# Patient Record
Sex: MALE | Race: WHITE | NOT HISPANIC OR LATINO | Employment: FULL TIME | URBAN - METROPOLITAN AREA
[De-identification: names, ages, dates, MRNs, and addresses within clinical notes are randomized per-mention and may not be internally consistent; named-entity substitution may affect disease eponyms.]

---

## 2023-03-15 DIAGNOSIS — I10 HYPERTENSION, UNSPECIFIED TYPE: Primary | ICD-10-CM

## 2023-03-15 RX ORDER — AMLODIPINE BESYLATE 5 MG/1
5 TABLET ORAL DAILY
Qty: 90 TABLET | Refills: 0 | Status: SHIPPED | OUTPATIENT
Start: 2023-03-15 | End: 2023-06-16

## 2023-03-15 RX ORDER — AMLODIPINE BESYLATE 5 MG/1
5 TABLET ORAL DAILY
COMMUNITY
End: 2023-03-15 | Stop reason: SDUPTHER

## 2023-09-14 DIAGNOSIS — I10 PRIMARY HYPERTENSION: Primary | ICD-10-CM

## 2023-09-14 RX ORDER — LOSARTAN POTASSIUM AND HYDROCHLOROTHIAZIDE 25; 100 MG/1; MG/1
1 TABLET ORAL
Qty: 7 TABLET | Refills: 0 | Status: SHIPPED | OUTPATIENT
Start: 2023-09-14 | End: 2023-09-20 | Stop reason: SDUPTHER

## 2023-09-14 RX ORDER — LOSARTAN POTASSIUM AND HYDROCHLOROTHIAZIDE 25; 100 MG/1; MG/1
1 TABLET ORAL
COMMUNITY
Start: 2021-10-06 | End: 2023-09-14 | Stop reason: SDUPTHER

## 2023-09-19 RX ORDER — GABAPENTIN 300 MG/1
300 CAPSULE ORAL EVERY 8 HOURS
COMMUNITY

## 2023-09-19 RX ORDER — ATENOLOL 25 MG/1
25 TABLET ORAL DAILY
COMMUNITY
End: 2023-09-20 | Stop reason: ALTCHOICE

## 2023-09-19 RX ORDER — FLUTICASONE PROPIONATE 50 MCG
SPRAY, SUSPENSION (ML) NASAL
COMMUNITY
Start: 2023-06-20 | End: 2023-09-20 | Stop reason: ALTCHOICE

## 2023-09-19 RX ORDER — ATORVASTATIN CALCIUM 40 MG/1
40 TABLET, FILM COATED ORAL DAILY
COMMUNITY
End: 2023-09-20

## 2023-09-19 RX ORDER — OMEPRAZOLE 20 MG/1
20 CAPSULE, DELAYED RELEASE ORAL
COMMUNITY
Start: 2023-06-20 | End: 2023-09-20 | Stop reason: ALTCHOICE

## 2023-09-20 ENCOUNTER — LAB (OUTPATIENT)
Dept: LAB | Facility: LAB | Age: 58
End: 2023-09-20
Payer: COMMERCIAL

## 2023-09-20 ENCOUNTER — OFFICE VISIT (OUTPATIENT)
Dept: PRIMARY CARE | Facility: CLINIC | Age: 58
End: 2023-09-20
Payer: COMMERCIAL

## 2023-09-20 VITALS — BODY MASS INDEX: 38.45 KG/M2 | SYSTOLIC BLOOD PRESSURE: 150 MMHG | WEIGHT: 268 LBS | DIASTOLIC BLOOD PRESSURE: 102 MMHG

## 2023-09-20 DIAGNOSIS — T39.1X5A: ICD-10-CM

## 2023-09-20 DIAGNOSIS — F41.9 ANXIETY: Primary | ICD-10-CM

## 2023-09-20 DIAGNOSIS — I10 PRIMARY HYPERTENSION: ICD-10-CM

## 2023-09-20 DIAGNOSIS — I10 HYPERTENSION, UNSPECIFIED TYPE: ICD-10-CM

## 2023-09-20 DIAGNOSIS — F41.0 PANIC ATTACK: ICD-10-CM

## 2023-09-20 LAB
ACETAMINOPHEN (UG/ML) IN SER/PLAS: <10 UG/ML (ref 10–30)
ALANINE AMINOTRANSFERASE (SGPT) (U/L) IN SER/PLAS: 56 U/L (ref 10–52)
ALBUMIN (G/DL) IN SER/PLAS: 4.8 G/DL (ref 3.4–5)
ALKALINE PHOSPHATASE (U/L) IN SER/PLAS: 64 U/L (ref 33–120)
ANION GAP IN SER/PLAS: 16 MMOL/L (ref 10–20)
ASPARTATE AMINOTRANSFERASE (SGOT) (U/L) IN SER/PLAS: 39 U/L (ref 9–39)
BILIRUBIN TOTAL (MG/DL) IN SER/PLAS: 0.5 MG/DL (ref 0–1.2)
CALCIUM (MG/DL) IN SER/PLAS: 10.1 MG/DL (ref 8.6–10.6)
CARBON DIOXIDE, TOTAL (MMOL/L) IN SER/PLAS: 28 MMOL/L (ref 21–32)
CHLORIDE (MMOL/L) IN SER/PLAS: 100 MMOL/L (ref 98–107)
CREATININE (MG/DL) IN SER/PLAS: 0.9 MG/DL (ref 0.5–1.3)
GFR MALE: >90 ML/MIN/1.73M2
GLUCOSE (MG/DL) IN SER/PLAS: 121 MG/DL (ref 74–99)
POTASSIUM (MMOL/L) IN SER/PLAS: 4.1 MMOL/L (ref 3.5–5.3)
PROTEIN TOTAL: 7.9 G/DL (ref 6.4–8.2)
SODIUM (MMOL/L) IN SER/PLAS: 140 MMOL/L (ref 136–145)
UREA NITROGEN (MG/DL) IN SER/PLAS: 14 MG/DL (ref 6–23)

## 2023-09-20 PROCEDURE — 3080F DIAST BP >= 90 MM HG: CPT | Performed by: STUDENT IN AN ORGANIZED HEALTH CARE EDUCATION/TRAINING PROGRAM

## 2023-09-20 PROCEDURE — 99214 OFFICE O/P EST MOD 30 MIN: CPT | Performed by: STUDENT IN AN ORGANIZED HEALTH CARE EDUCATION/TRAINING PROGRAM

## 2023-09-20 PROCEDURE — 36415 COLL VENOUS BLD VENIPUNCTURE: CPT

## 2023-09-20 PROCEDURE — 80143 DRUG ASSAY ACETAMINOPHEN: CPT

## 2023-09-20 PROCEDURE — 3077F SYST BP >= 140 MM HG: CPT | Performed by: STUDENT IN AN ORGANIZED HEALTH CARE EDUCATION/TRAINING PROGRAM

## 2023-09-20 PROCEDURE — 80053 COMPREHEN METABOLIC PANEL: CPT

## 2023-09-20 PROCEDURE — 1036F TOBACCO NON-USER: CPT | Performed by: STUDENT IN AN ORGANIZED HEALTH CARE EDUCATION/TRAINING PROGRAM

## 2023-09-20 RX ORDER — LOSARTAN POTASSIUM AND HYDROCHLOROTHIAZIDE 25; 100 MG/1; MG/1
1 TABLET ORAL
Qty: 90 TABLET | Refills: 1 | Status: SHIPPED | OUTPATIENT
Start: 2023-09-20 | End: 2024-01-31 | Stop reason: SDUPTHER

## 2023-09-20 RX ORDER — AMLODIPINE BESYLATE 5 MG/1
5 TABLET ORAL DAILY
Qty: 90 TABLET | Refills: 1 | Status: SHIPPED | OUTPATIENT
Start: 2023-09-20 | End: 2024-01-31 | Stop reason: SDUPTHER

## 2023-09-20 RX ORDER — HYDROXYZINE HYDROCHLORIDE 25 MG/1
25 TABLET, FILM COATED ORAL 2 TIMES DAILY PRN
Qty: 60 TABLET | Refills: 0 | Status: SHIPPED | OUTPATIENT
Start: 2023-09-20 | End: 2023-11-09 | Stop reason: SDUPTHER

## 2023-09-20 RX ORDER — DULOXETIN HYDROCHLORIDE 20 MG/1
20 CAPSULE, DELAYED RELEASE ORAL DAILY
Qty: 30 CAPSULE | Refills: 5 | Status: SHIPPED | OUTPATIENT
Start: 2023-09-20 | End: 2023-10-04 | Stop reason: SDUPTHER

## 2023-10-04 ENCOUNTER — TELEPHONE (OUTPATIENT)
Dept: PRIMARY CARE | Facility: CLINIC | Age: 58
End: 2023-10-04
Payer: COMMERCIAL

## 2023-10-04 DIAGNOSIS — F41.0 PANIC ATTACK: ICD-10-CM

## 2023-10-04 DIAGNOSIS — F41.9 ANXIETY: ICD-10-CM

## 2023-10-04 RX ORDER — DULOXETINE 40 MG/1
40 CAPSULE, DELAYED RELEASE ORAL DAILY
Qty: 90 CAPSULE | Refills: 1 | Status: SHIPPED | OUTPATIENT
Start: 2023-10-04 | End: 2023-11-09 | Stop reason: SINTOL

## 2023-11-01 ENCOUNTER — APPOINTMENT (OUTPATIENT)
Dept: PRIMARY CARE | Facility: CLINIC | Age: 58
End: 2023-11-01
Payer: COMMERCIAL

## 2023-11-01 PROBLEM — S20.211A CONTUSION OF RIGHT FRONT WALL OF THORAX: Status: ACTIVE | Noted: 2023-03-22

## 2023-11-01 PROBLEM — R11.0 NAUSEA: Status: ACTIVE | Noted: 2023-11-01

## 2023-11-01 PROBLEM — S05.02XA ABRASION OF CORNEA, LEFT: Status: ACTIVE | Noted: 2023-11-01

## 2023-11-01 PROBLEM — H52.7 REFRACTION ERROR: Status: ACTIVE | Noted: 2023-11-01

## 2023-11-01 PROBLEM — S30.810A ABRASION OF BUTTOCK: Status: ACTIVE | Noted: 2023-03-22

## 2023-11-01 PROBLEM — F41.9 ANXIETY: Status: ACTIVE | Noted: 2023-11-01

## 2023-11-01 PROBLEM — S19.89XA: Status: ACTIVE | Noted: 2023-03-23

## 2023-11-01 PROBLEM — H53.8 BLURRED VISION: Status: ACTIVE | Noted: 2023-03-22

## 2023-11-01 PROBLEM — K04.7 DENTAL INFECTION: Status: ACTIVE | Noted: 2023-11-01

## 2023-11-01 PROBLEM — I50.9 HEART FAILURE (MULTI): Status: ACTIVE | Noted: 2023-11-01

## 2023-11-01 PROBLEM — S00.01XA ABRASION OF SCALP: Status: ACTIVE | Noted: 2023-03-22

## 2023-11-01 PROBLEM — M79.673 FOOT PAIN: Status: ACTIVE | Noted: 2023-11-01

## 2023-11-01 PROBLEM — M25.561 RIGHT KNEE PAIN: Status: ACTIVE | Noted: 2023-03-22

## 2023-11-01 PROBLEM — M79.89 SWELLING OF LOWER EXTREMITY: Status: ACTIVE | Noted: 2023-11-01

## 2023-11-01 PROBLEM — I10 HYPERTENSION: Status: ACTIVE | Noted: 2023-11-01

## 2023-11-01 PROBLEM — R07.9 CHEST PAIN: Status: ACTIVE | Noted: 2023-11-01

## 2023-11-01 PROBLEM — R52 PAIN: Status: ACTIVE | Noted: 2023-11-01

## 2023-11-06 ASSESSMENT — ENCOUNTER SYMPTOMS
PALPITATIONS: 0
ORTHOPNEA: 0
HYPERTENSION: 1
NECK PAIN: 0
BLURRED VISION: 0
HEADACHES: 0
PND: 0
SHORTNESS OF BREATH: 0
SWEATS: 0

## 2023-11-09 ENCOUNTER — OFFICE VISIT (OUTPATIENT)
Dept: PRIMARY CARE | Facility: CLINIC | Age: 58
End: 2023-11-09
Payer: COMMERCIAL

## 2023-11-09 VITALS — BODY MASS INDEX: 39.17 KG/M2 | DIASTOLIC BLOOD PRESSURE: 100 MMHG | WEIGHT: 273 LBS | SYSTOLIC BLOOD PRESSURE: 170 MMHG

## 2023-11-09 DIAGNOSIS — F41.0 PANIC ATTACK: ICD-10-CM

## 2023-11-09 DIAGNOSIS — F41.9 ANXIETY: ICD-10-CM

## 2023-11-09 DIAGNOSIS — I10 PRIMARY HYPERTENSION: Primary | ICD-10-CM

## 2023-11-09 PROBLEM — M79.89 SWELLING OF LOWER EXTREMITY: Status: RESOLVED | Noted: 2023-11-01 | Resolved: 2023-11-09

## 2023-11-09 PROBLEM — M79.673 FOOT PAIN: Status: RESOLVED | Noted: 2023-11-01 | Resolved: 2023-11-09

## 2023-11-09 PROBLEM — R07.9 CHEST PAIN: Status: RESOLVED | Noted: 2023-11-01 | Resolved: 2023-11-09

## 2023-11-09 PROBLEM — I50.9 HEART FAILURE (MULTI): Status: RESOLVED | Noted: 2023-11-01 | Resolved: 2023-11-09

## 2023-11-09 PROBLEM — M25.561 RIGHT KNEE PAIN: Status: RESOLVED | Noted: 2023-03-22 | Resolved: 2023-11-09

## 2023-11-09 PROBLEM — K04.7 DENTAL INFECTION: Status: RESOLVED | Noted: 2023-11-01 | Resolved: 2023-11-09

## 2023-11-09 PROCEDURE — 99214 OFFICE O/P EST MOD 30 MIN: CPT | Performed by: STUDENT IN AN ORGANIZED HEALTH CARE EDUCATION/TRAINING PROGRAM

## 2023-11-09 PROCEDURE — 3080F DIAST BP >= 90 MM HG: CPT | Performed by: STUDENT IN AN ORGANIZED HEALTH CARE EDUCATION/TRAINING PROGRAM

## 2023-11-09 PROCEDURE — 3077F SYST BP >= 140 MM HG: CPT | Performed by: STUDENT IN AN ORGANIZED HEALTH CARE EDUCATION/TRAINING PROGRAM

## 2023-11-09 PROCEDURE — 1036F TOBACCO NON-USER: CPT | Performed by: STUDENT IN AN ORGANIZED HEALTH CARE EDUCATION/TRAINING PROGRAM

## 2023-11-09 RX ORDER — SALINE NASAL SPRAY 1.5 OZ
SOLUTION NASAL
COMMUNITY
Start: 2023-06-20 | End: 2023-11-09 | Stop reason: ALTCHOICE

## 2023-11-09 RX ORDER — LIDOCAINE 50 MG/G
PATCH TOPICAL
COMMUNITY
Start: 2023-05-17

## 2023-11-09 RX ORDER — MELOXICAM 15 MG/1
TABLET ORAL
COMMUNITY
Start: 2023-03-25 | End: 2023-11-09 | Stop reason: ALTCHOICE

## 2023-11-09 RX ORDER — ONDANSETRON 4 MG/1
TABLET, FILM COATED ORAL
COMMUNITY
End: 2023-11-09 | Stop reason: ALTCHOICE

## 2023-11-09 RX ORDER — TRAMADOL HYDROCHLORIDE 50 MG/1
50 TABLET ORAL
COMMUNITY
Start: 2010-06-17 | End: 2023-11-09 | Stop reason: ALTCHOICE

## 2023-11-09 RX ORDER — VENLAFAXINE HYDROCHLORIDE 37.5 MG/1
CAPSULE, EXTENDED RELEASE ORAL
COMMUNITY
Start: 2021-08-06 | End: 2023-11-09 | Stop reason: ALTCHOICE

## 2023-11-09 RX ORDER — HYDROXYZINE HYDROCHLORIDE 25 MG/1
25 TABLET, FILM COATED ORAL 2 TIMES DAILY PRN
Qty: 60 TABLET | Refills: 0 | Status: SHIPPED | OUTPATIENT
Start: 2023-11-09 | End: 2024-01-31 | Stop reason: SDUPTHER

## 2023-11-09 RX ORDER — OXYCODONE AND ACETAMINOPHEN 5; 325 MG/1; MG/1
TABLET ORAL
COMMUNITY
Start: 2005-11-19 | End: 2023-11-09 | Stop reason: ALTCHOICE

## 2023-11-09 RX ORDER — LOSARTAN POTASSIUM AND HYDROCHLOROTHIAZIDE 12.5; 5 MG/1; MG/1
1 TABLET ORAL DAILY
COMMUNITY
End: 2023-11-09 | Stop reason: WASHOUT

## 2023-11-09 RX ORDER — CHLORHEXIDINE GLUCONATE ORAL RINSE 1.2 MG/ML
SOLUTION DENTAL EVERY 12 HOURS
COMMUNITY
Start: 2020-10-18 | End: 2023-11-09 | Stop reason: ALTCHOICE

## 2023-11-09 RX ORDER — BUPRENORPHINE AND NALOXONE 2; .5 MG/1; MG/1
FILM, SOLUBLE BUCCAL; SUBLINGUAL
COMMUNITY

## 2023-11-09 RX ORDER — DOXAZOSIN 2 MG/1
2 TABLET ORAL NIGHTLY
Qty: 30 TABLET | Refills: 5 | Status: SHIPPED | OUTPATIENT
Start: 2023-11-09 | End: 2024-01-31 | Stop reason: SDUPTHER

## 2023-11-09 RX ORDER — PANTOPRAZOLE SODIUM 40 MG/1
TABLET, DELAYED RELEASE ORAL
COMMUNITY
End: 2023-11-09 | Stop reason: ALTCHOICE

## 2023-11-09 RX ORDER — OXYCODONE AND ACETAMINOPHEN 10; 325 MG/1; MG/1
TABLET ORAL
COMMUNITY
End: 2023-11-09 | Stop reason: ALTCHOICE

## 2023-11-09 RX ORDER — CITALOPRAM 10 MG/1
10 TABLET ORAL DAILY
Qty: 7 TABLET | Refills: 0 | Status: SHIPPED | OUTPATIENT
Start: 2023-11-09 | End: 2024-01-12 | Stop reason: WASHOUT

## 2023-11-09 RX ORDER — DOCUSATE SODIUM 100 MG/1
CAPSULE, LIQUID FILLED ORAL
COMMUNITY
Start: 2005-11-19 | End: 2023-11-09 | Stop reason: ALTCHOICE

## 2023-11-09 RX ORDER — SERTRALINE HYDROCHLORIDE 25 MG/1
TABLET, FILM COATED ORAL
COMMUNITY
Start: 2021-07-12 | End: 2023-11-09 | Stop reason: ALTCHOICE

## 2023-11-09 RX ORDER — CYCLOBENZAPRINE HCL 10 MG
TABLET ORAL
COMMUNITY
Start: 2010-06-17 | End: 2023-11-09 | Stop reason: ALTCHOICE

## 2023-11-09 RX ORDER — NAPROXEN 500 MG/1
1 TABLET ORAL 2 TIMES DAILY PRN
COMMUNITY
Start: 2010-06-17 | End: 2023-11-09 | Stop reason: ALTCHOICE

## 2023-11-09 RX ORDER — CITALOPRAM 20 MG/1
20 TABLET, FILM COATED ORAL DAILY
Qty: 30 TABLET | Refills: 1 | Status: SHIPPED | OUTPATIENT
Start: 2023-11-16 | End: 2024-01-12

## 2023-11-09 NOTE — PROGRESS NOTES
Answers submitted by the patient for this visit:  High Blood Pressure Questionnaire (Submitted on 11/6/2023)  Chief Complaint: Hypertension  Chronicity: recurrent  Onset: more than 1 year ago  Progression since onset: gradually improving  Condition status: controlled  anxiety: No  blurred vision: No  chest pain: No  headaches: No  malaise/fatigue: No  neck pain: No  orthopnea: No  palpitations: No  peripheral edema: No  PND: No  shortness of breath: No  sweats: No  CAD risks: no known risk factors, stress

## 2023-11-09 NOTE — PROGRESS NOTES
Subjective   Patient ID: Jeramie Faria is a 58 y.o. male who presents for Follow-up, Med Refill, and sciatica and neck pain.  HPI  Jeramie reports for medication refill and significant anxiety.     He reports that he is not noticing much improvement with his anxiety, despite increasing the dose of cymbalta to 40mg daily. He has a rad time falling asleep at night. He has been taking atarax sparingly for breakthrough panic. His blood pressure also remains uncontrolled. Anxiety is playing a factor. No other voiced complaints at this time. No current SI/HI. Following with disability physician for gabapentin, will discuss increasing nighttime dose.    Review of Systems  12-point ROS was reviewed and is negative, unless otherwise noted in HPI    Objective   Vitals:    11/09/23 1132   BP: (!) 170/100      Physical Exam  GEN: alert, conversant, anxious appearing  HEENT: PERRL, EOMI, MMM  NECK: supple, no LAD appreciated  CHEST: CTAB  CV: S1, S2, RRR, no murmurs appreciated  ABD: soft, NT, ND  EXT: no significant LE edema  SKIN: warm, dry    Assessment/Plan   #Hypertension, uncontrolled  Adverse side effect of feet swelling with 10mg amlodipine  - Continue amlodipine 5mg, losartan-hydrochlorothiazide 100-25mg daily. Add doxazosin 2mg nightly  - discussed dietary modifications DASH diet, advised adherence to appointments as renewals were denied due to patient not being seen in the office.  - Patient is to take log of his blood pressures, he will call if it persistently is elevated, he is agreeable with this plan.   - Large component of anxiety suspected     #Depression/Anxiety  #Panic Attacks  - PHQ-9 score indicates severe depression  - Offered referral for counseling/therapy services - he has established with psychiatry via worker's comp  - Discussed medication options   - wean off cymbalta and start taking celexa daily. Start with celexa 10mg daily and increase to 20mg after 1 week.  - Continue atarax 25mg, PRN breakthrough  anxiety. SE profile discussed  - Will have the patient return in 6-8 weeks to discuss change in symptoms with medication    RTC in 6-8 weeks, sooner PRN     Dm Montes, DO

## 2023-12-31 ENCOUNTER — HOSPITAL ENCOUNTER (EMERGENCY)
Facility: HOSPITAL | Age: 58
Discharge: OTHER INSTITUTION | End: 2024-01-01
Attending: EMERGENCY MEDICINE
Payer: COMMERCIAL

## 2023-12-31 DIAGNOSIS — I10 PRIMARY HYPERTENSION: Primary | ICD-10-CM

## 2023-12-31 DIAGNOSIS — R45.851 SUICIDAL IDEATION: ICD-10-CM

## 2023-12-31 LAB
ALBUMIN SERPL-MCNC: 4.4 G/DL (ref 3.5–5)
ALP BLD-CCNC: 71 U/L (ref 35–125)
ALT SERPL-CCNC: 39 U/L (ref 5–40)
AMPHETAMINES UR QL SCN>1000 NG/ML: NEGATIVE
ANION GAP SERPL CALC-SCNC: 14 MMOL/L
APPEARANCE UR: CLEAR
AST SERPL-CCNC: 30 U/L (ref 5–40)
BARBITURATES UR QL SCN>300 NG/ML: NEGATIVE
BASOPHILS # BLD AUTO: 0.03 X10*3/UL (ref 0–0.1)
BASOPHILS NFR BLD AUTO: 0.4 %
BENZODIAZ UR QL SCN>300 NG/ML: NEGATIVE
BILIRUB SERPL-MCNC: 0.4 MG/DL (ref 0.1–1.2)
BILIRUB UR STRIP.AUTO-MCNC: NEGATIVE MG/DL
BUN SERPL-MCNC: 20 MG/DL (ref 8–25)
BZE UR QL SCN>300 NG/ML: NEGATIVE
CALCIUM SERPL-MCNC: 9.1 MG/DL (ref 8.5–10.4)
CANNABINOIDS UR QL SCN>50 NG/ML: NEGATIVE
CHLORIDE SERPL-SCNC: 98 MMOL/L (ref 97–107)
CO2 SERPL-SCNC: 23 MMOL/L (ref 24–31)
COLOR UR: NORMAL
CREAT SERPL-MCNC: 0.9 MG/DL (ref 0.4–1.6)
EOSINOPHIL # BLD AUTO: 0.38 X10*3/UL (ref 0–0.7)
EOSINOPHIL NFR BLD AUTO: 5.3 %
ERYTHROCYTE [DISTWIDTH] IN BLOOD BY AUTOMATED COUNT: 11.8 % (ref 11.5–14.5)
ETHANOL SERPL-MCNC: <0.01 G/DL
FENTANYL+NORFENTANYL UR QL SCN: NEGATIVE
GFR SERPL CREATININE-BSD FRML MDRD: >90 ML/MIN/1.73M*2
GLUCOSE SERPL-MCNC: 166 MG/DL (ref 65–99)
GLUCOSE UR STRIP.AUTO-MCNC: NORMAL MG/DL
HCT VFR BLD AUTO: 39.9 % (ref 41–52)
HGB BLD-MCNC: 13.6 G/DL (ref 13.5–17.5)
HOLD SPECIMEN: NORMAL
IMM GRANULOCYTES # BLD AUTO: 0.03 X10*3/UL (ref 0–0.7)
IMM GRANULOCYTES NFR BLD AUTO: 0.4 % (ref 0–0.9)
KETONES UR STRIP.AUTO-MCNC: NEGATIVE MG/DL
LEUKOCYTE ESTERASE UR QL STRIP.AUTO: NEGATIVE
LYMPHOCYTES # BLD AUTO: 2.83 X10*3/UL (ref 1.2–4.8)
LYMPHOCYTES NFR BLD AUTO: 39.6 %
MCH RBC QN AUTO: 30.7 PG (ref 26–34)
MCHC RBC AUTO-ENTMCNC: 34.1 G/DL (ref 32–36)
MCV RBC AUTO: 90 FL (ref 80–100)
METHADONE UR QL SCN>300 NG/ML: NEGATIVE
MONOCYTES # BLD AUTO: 0.66 X10*3/UL (ref 0.1–1)
MONOCYTES NFR BLD AUTO: 9.2 %
NEUTROPHILS # BLD AUTO: 3.21 X10*3/UL (ref 1.2–7.7)
NEUTROPHILS NFR BLD AUTO: 45.1 %
NITRITE UR QL STRIP.AUTO: NEGATIVE
NRBC BLD-RTO: 0 /100 WBCS (ref 0–0)
OPIATES UR QL SCN>300 NG/ML: NEGATIVE
OXYCODONE UR QL: NEGATIVE
PCP UR QL SCN>25 NG/ML: NEGATIVE
PH UR STRIP.AUTO: 5.5 [PH]
PLATELET # BLD AUTO: 235 X10*3/UL (ref 150–450)
POTASSIUM SERPL-SCNC: 3.7 MMOL/L (ref 3.4–5.1)
PROT SERPL-MCNC: 7.3 G/DL (ref 5.9–7.9)
PROT UR STRIP.AUTO-MCNC: NEGATIVE MG/DL
RBC # BLD AUTO: 4.43 X10*6/UL (ref 4.5–5.9)
RBC # UR STRIP.AUTO: NEGATIVE /UL
SARS-COV-2 RNA RESP QL NAA+PROBE: NOT DETECTED
SODIUM SERPL-SCNC: 135 MMOL/L (ref 133–145)
SP GR UR STRIP.AUTO: 1.02
UROBILINOGEN UR STRIP.AUTO-MCNC: NORMAL MG/DL
WBC # BLD AUTO: 7.1 X10*3/UL (ref 4.4–11.3)

## 2023-12-31 PROCEDURE — 90839 PSYTX CRISIS INITIAL 60 MIN: CPT

## 2023-12-31 PROCEDURE — 81003 URINALYSIS AUTO W/O SCOPE: CPT | Mod: 59 | Performed by: PHYSICIAN ASSISTANT

## 2023-12-31 PROCEDURE — 87635 SARS-COV-2 COVID-19 AMP PRB: CPT | Performed by: PHYSICIAN ASSISTANT

## 2023-12-31 PROCEDURE — 82550 ASSAY OF CK (CPK): CPT | Performed by: EMERGENCY MEDICINE

## 2023-12-31 PROCEDURE — 80307 DRUG TEST PRSMV CHEM ANLYZR: CPT | Performed by: PHYSICIAN ASSISTANT

## 2023-12-31 PROCEDURE — 36415 COLL VENOUS BLD VENIPUNCTURE: CPT | Performed by: PHYSICIAN ASSISTANT

## 2023-12-31 PROCEDURE — 85025 COMPLETE CBC W/AUTO DIFF WBC: CPT | Performed by: PHYSICIAN ASSISTANT

## 2023-12-31 PROCEDURE — 80053 COMPREHEN METABOLIC PANEL: CPT | Performed by: PHYSICIAN ASSISTANT

## 2023-12-31 PROCEDURE — 99285 EMERGENCY DEPT VISIT HI MDM: CPT | Performed by: EMERGENCY MEDICINE

## 2023-12-31 PROCEDURE — 82077 ASSAY SPEC XCP UR&BREATH IA: CPT | Performed by: PHYSICIAN ASSISTANT

## 2023-12-31 PROCEDURE — 2500000001 HC RX 250 WO HCPCS SELF ADMINISTERED DRUGS (ALT 637 FOR MEDICARE OP): Performed by: PHYSICIAN ASSISTANT

## 2023-12-31 PROCEDURE — 84484 ASSAY OF TROPONIN QUANT: CPT | Performed by: EMERGENCY MEDICINE

## 2023-12-31 RX ORDER — AMLODIPINE BESYLATE 5 MG/1
5 TABLET ORAL ONCE
Status: COMPLETED | OUTPATIENT
Start: 2023-12-31 | End: 2023-12-31

## 2023-12-31 RX ORDER — HYDROXYZINE HYDROCHLORIDE 25 MG/1
25 TABLET, FILM COATED ORAL ONCE
Status: COMPLETED | OUTPATIENT
Start: 2023-12-31 | End: 2023-12-31

## 2023-12-31 RX ADMIN — HYDROCHLOROTHIAZIDE: 12.5 CAPSULE ORAL at 21:07

## 2023-12-31 RX ADMIN — AMLODIPINE BESYLATE 5 MG: 5 TABLET ORAL at 21:06

## 2023-12-31 RX ADMIN — HYDROXYZINE HYDROCHLORIDE 25 MG: 25 TABLET, FILM COATED ORAL at 12:30

## 2023-12-31 SDOH — HEALTH STABILITY: MENTAL HEALTH: IN THE PAST FEW WEEKS, HAVE YOU WISHED YOU WERE DEAD?: YES

## 2023-12-31 SDOH — HEALTH STABILITY: MENTAL HEALTH: IN THE PAST WEEK, HAVE YOU BEEN HAVING THOUGHTS ABOUT KILLING YOURSELF?: YES

## 2023-12-31 SDOH — HEALTH STABILITY: MENTAL HEALTH: HOW DID YOU TRY TO KILL YOURSELF?: OD ON PILLS

## 2023-12-31 SDOH — HEALTH STABILITY: MENTAL HEALTH

## 2023-12-31 SDOH — HEALTH STABILITY: MENTAL HEALTH: ARE YOU HAVING THOUGHTS OF KILLING YOURSELF RIGHT NOW?: YES

## 2023-12-31 SDOH — HEALTH STABILITY: MENTAL HEALTH: IN THE PAST FEW WEEKS, HAVE YOU FELT THAT YOU OR YOUR FAMILY WOULD BE BETTER OFF IF YOU WERE DEAD?: YES

## 2023-12-31 SDOH — HEALTH STABILITY: MENTAL HEALTH
DESCRIBE YOUR THOUGHTS OF KILLING YOURSELF RIGHT NOW:: PT STATES HE FEELS HIS FAMILY WOULD BE BETTER OFF WITHOUT HIM, HE IS MAKING EVERYONE LIKFE MISERABLE

## 2023-12-31 SDOH — ECONOMIC STABILITY: HOUSING INSECURITY: FEELS SAFE LIVING IN HOME: YES

## 2023-12-31 SDOH — HEALTH STABILITY: MENTAL HEALTH: HAVE YOU EVER TRIED TO KILL YOURSELF?: YES

## 2023-12-31 SDOH — HEALTH STABILITY: MENTAL HEALTH: ANXIETY SYMPTOMS: PANIC ATTACK;EXCESSIVE SWEATING;SHORTNESS OF BREATH;SOCIAL PHOBIAS

## 2023-12-31 SDOH — HEALTH STABILITY: MENTAL HEALTH
DEPRESSION SYMPTOMS: CHANGE IN ENERGY LEVEL;DECREASED LIBIDO;FEELINGS OF HOPELESSESS;FEELINGS OF WORTHLESSNESS;INCREASED IRRITABILITY;SLEEP DISTURBANCE

## 2023-12-31 ASSESSMENT — COLUMBIA-SUICIDE SEVERITY RATING SCALE - C-SSRS
5. HAVE YOU STARTED TO WORK OUT OR WORKED OUT THE DETAILS OF HOW TO KILL YOURSELF? DO YOU INTEND TO CARRY OUT THIS PLAN?: YES
2. HAVE YOU ACTUALLY HAD ANY THOUGHTS OF KILLING YOURSELF?: YES
4. HAVE YOU HAD THESE THOUGHTS AND HAD SOME INTENTION OF ACTING ON THEM?: YES
1. IN THE PAST MONTH, HAVE YOU WISHED YOU WERE DEAD OR WISHED YOU COULD GO TO SLEEP AND NOT WAKE UP?: YES
6. HAVE YOU EVER DONE ANYTHING, STARTED TO DO ANYTHING, OR PREPARED TO DO ANYTHING TO END YOUR LIFE?: YES
6. HAVE YOU EVER DONE ANYTHING, STARTED TO DO ANYTHING, OR PREPARED TO DO ANYTHING TO END YOUR LIFE?: YES

## 2023-12-31 ASSESSMENT — PAIN - FUNCTIONAL ASSESSMENT: PAIN_FUNCTIONAL_ASSESSMENT: 0-10

## 2023-12-31 ASSESSMENT — LIFESTYLE VARIABLES
PRESCIPTION_ABUSE_PAST_12_MONTHS: NO
EVER FELT BAD OR GUILTY ABOUT YOUR DRINKING: NO
HAVE PEOPLE ANNOYED YOU BY CRITICIZING YOUR DRINKING: NO
HAVE YOU EVER FELT YOU SHOULD CUT DOWN ON YOUR DRINKING: NO
SUBSTANCE_ABUSE_PAST_12_MONTHS: NO
EVER HAD A DRINK FIRST THING IN THE MORNING TO STEADY YOUR NERVES TO GET RID OF A HANGOVER: NO
REASON UNABLE TO ASSESS: NO

## 2023-12-31 ASSESSMENT — PAIN SCALES - GENERAL: PAINLEVEL_OUTOF10: 0 - NO PAIN

## 2023-12-31 NOTE — PROGRESS NOTES
Social Work Note    TCT [facility] for update. Spoke with She who provided accepting Dr. Flores and nurse to nurse for [address] unit at [phone]. [facility] requesting information wait to be given to RN staff because they have a code at the facility they are trying to handle. She states she will call with update when code is over for RN to call for nurse to nurse.

## 2023-12-31 NOTE — PROGRESS NOTES
"IN BRIEF    History: 58-year-old male who presents with insomnia, racing thoughts, depression, and \"feeling trapped\".  He has a significant history of COPD diagnosed after a traumatic event/injury in March.  He has not been able to obtain treatment for his mental health.  He is on no medications for this.    Exam: Patient anxious, pacing and agitated though directable and agitated with situation, not provider, no internal stimulation       ED COURSE   MDM: Patient presents for psychiatric evaluation.  She had an patient was evaluated by social work who pink slipped him feeling that he requires inpatient psychiatric admission for stabilization    I personally saw the patient and made/approved the management plan and take responsibility for the patient management.  Parts of this chart were completed with dictation software, please excuse any errors in transcription.     Bhavna Martinez, DO  1:57 PM                    "

## 2023-12-31 NOTE — PROGRESS NOTES
"EPAT - Social Work Psychiatric Assessment    Arrival Details  Mode of Arrival: Ambulatory  Admission Source: Home  Admission Type: Involuntary (pink slip by Health officer in ED)  EPAT Assessment Start Date: 12/31/23  EPAT Assessment Start Time: 1150  Name of : JUANI Marcus    History of Present Illness  Admission Reason: Psychiatric Evaluation for PTSD and depression with SI, plan to overdose on pills.  HPI: Pt is a 57y/o male presenting to [facility] ED with complaint of depression and SI, plan to overdose. Pt is also reporting PTSD symptoms including anxiety, \"feeling trapped\", insomnia and anger outbursts.  reviewed the patient's chart and medical record which indicates a history of PTSD, depression and high blood pressure. Pt is being treated for a back injury as well s/p injury at work in March.  No EPAT assessments to review. The triage risk assessment was reviewed and pt was  indicated to be high risk, pt assigned a sitter.    SW Readmission Information   Readmission within 30 Days: No    Psychiatric Symptoms  Anxiety Symptoms: Panic attack, Excessive sweating, Shortness of breath, Social phobias  Depression Symptoms: Change in energy level, Decreased libido, Feelings of hopelessess, Feelings of worthlessness, Increased irritability, Sleep disturbance  Zoe Symptoms: No problems reported or observed.    Psychosis Symptoms  Hallucination Type: No problems reported or observed.  Delusion Type: No problems reported or observed.    Additional Symptoms - Adult  Generalized Anxiety Disorder: Restlessness, Sleep disturbance  Obsessive Compulsive Disorder: No problems reported or observed.  Panic Attack: Sweaty/clammy, Shortness of breath  Post Traumatic Stress Disorder: Other (Comment), Avoidance of stimuli associated w/ event, Exaggerated startle response, Hypervigilance, Irritability, Outbursts of anger, Re-living event  Delirium: No problems reported or observed.  Review of Symptoms " "Comments: Pt reports has been sleeping only 2 hours a night, feels \"triggered\" by things like being wrapped up in his blankets too tightly, being around alot of people and being in enclosed spaces. Pt and family have noticed an increase in irritability and anger outbursts the past couple of months.    Past Psychiatric History/Meds/Treatments  Past Psychiatric History: Pt was diagnosed with PTSD and depression in 2004 after being injured at work , and is now experiencing similar symptoms after another work injury (mine collapse) in March 2023. No h/o SIB but pt reports he attempted suciide by OD on pills last week.  Past Psychiatric Meds/Treatments: Pt has not been hospitalized psychaitrically. When he was being treated in 2004 he was prescriebed Seroquel which he felt was helpful; with medication and counseling his symptoms improved./ Currently pt is prescribed hydroxizone, gabapentin and BP meds by his PCP; pt is med complaint  Past Violence/Victimization History: Pt denies current history of abuse and denies any past history of abuse including physical, sexual and emotional abuse. 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 Pt has a son with whom he is close, 2 grandchildren    Legal  Legal Considerations: Patient/ Family Ability to Make Healthcare Decisions  Criminal Activity/ Legal Involvement Pertinent to Current Situation/ Hospitalization: None  Legal Concerns: [place]: Self POA: None Payee: None  Legal Comments: None reported    Drug Screening  Have you used any substances (canabis, cocaine, heroin, hallucinogens, inhalants, etc.) in the past 12 months?: No  Have you used any prescription drugs other than prescribed in the past 12 months?: No  Is a toxicology screen needed?: Yes         Psychosocial  Psychosocial (WDL): Within Defined Limits    Orientation  Orientation Level: Oriented X4, Appropriate for developmental age    General Appearance  Motor Activity: Restlessness  Speech Pattern: Pressured  General Attitude: Cooperative  Appearance/Hygiene: Unremarkable    Thought Process  Coherency: Circumstantial  Content: Unremarkable  Delusions: Other (Comment)  Perception: Not altered  Hallucination: None  Judgment/Insight: Limited  Confusion: None  Cognition: Poor judgement, Appropriate for developmental age         Risk Factors  Self Harm/Suicidal Ideation Plan: Current Plan to OD on pills at home  Previous Self Harm/Suicidal Plans: Past: reports an overdose attempt a coulpe weeks ago  Risk Factors: Male  Description of Thoughts/Ideas Leaving Unit Now: none, pt is cooperative and agreeable to plan for inpatient admission    Violence Risk Assessment  Assessment of Violence: None noted  Thoughts of Harm to Others: No (denies any current HI or plan; reports he has been getting angry and has feelings of rage when he \"feels trapped\")    Ability to Assess Risk Screen  Risk Screen - Ability to Assess: Able to be screened  Ask Suicide-Screening Questions  1. In the past few weeks, have you wished you were dead?: Yes  2. In the past " few weeks, have you felt that you or your family would be better off if you were dead?: Yes  3. In the past week, have you been having thoughts about killing yourself?: Yes  4. Have you ever tried to kill yourself?: Yes  How did you try to kill yourself?: OD on pills  When did you try to kill yourself?: 2 weeks ago  5. Are you having thoughts of killing yourself right now?: Yes  Describe your thoughts of killing yourself right now:: Pt states he feels his family would be better off without him, he is making everyone likfe miserable  Calculated Risk Score: Imminent Risk  Step 1: Risk Factors  Current & Past Psychiatric Dx: PTSD, Mood disorder  Presenting Symptoms: Impulsivity, Hopelessness or despair, Anxiety and/or panic, Insomia  Precipitants/Stressors: Triggering events leading to humiliation, shame, and/or despair (e.g. loss of relationship, financial or health status) (real or anticipated), Perceived burden on others, Social isolation  Change in Treatment: Non-compliant or not receiving treatment  Access to Lethal Methods : No (pt did have guns that he inherited from his grandfather, but all weapons were removed by pt's son this week)  Step 2: Protective Factors   Protective Factors Internal: Identifies reasons for living  Protective Factors External: Responsibility to children  Step 3: Suicidal Ideation Intensity  Most Severe Suicidal Ideation Identified: plan to OD  How Many Times Have You Had These Thoughts: 2-5 times in a week  When You Have the Thoughts How Long do They Last : Less than 1 hour/some of the time  Could/Can You Stop Thinking About Killing Yourself or Wanting to Die if You Want to: Can control thoughts with a lot of difficulty  Are There Things - Anyone or Anything - That Stopped You From Wanting to Die or Acting on: Deterrents most likely did not stop you  What Sort of Reasons Did You Have For Thinking About Wanting to Die or Killing Yourself: Mostly to end or stop the pain (you couldn't go on  "living with the pain or how you were feeling)  Total Score: 17  Step 5: Documentation  Risk Level: High suicide risk    Psychiatric Impression and Plan of Care  Assessment and Plan: Upon assessment, pt presents as cooperative but anxious, speech is pressured and somewhat circumsantial but affect is appropriate. Pt is A&O x4, agreeable to plan of care. Pt reports he was in a work accident in March where he was trapped underneath a hydraulic lift and sustained some back and leg injuries. At the time pt felt like he couldn't breathe and was concerned that he would die. Since then pt has been experiencing worsening  PTSD symptoms including very poor sleep (only about 2 hrs a night), anxiety and panic attacks when \"feeling trapped\" (incidents have included being accidentally locked in his apartments basement, being in the grocery store when it was very busy) and anger outbursts. Pt has been having suicidal thoughts as a result, did attempt to OD 2 weeks ago. Pt states he feels \"like a burden\" to his family and he is \"making their lives miserable\" Pt states if he isnt thinking about the incident he feels ok, but sleep has been difficult and he feels like he is easily triggered by situations and flashbacks to the incident. He is angry with his employer for how the injury happened, and the anger will spill over into other situations (road rage incident, arguments with his neighbors) Pt feels that without immediate treatmetn \"either I'll kill myself or go postal\"  The [place] -Suicide Severity Rating Scale (C-SSRS) was reviewed after the assessment was completed and the patient was indicated to be High risk. He would benefit from inpatient admission for safety and further treatment.  Specific Resources Provided to Patient: Peer support services not needed at this time.  Plan Comments: Diagnostic Impression: PTSD, major Depressive D/O  Plan: Inpatient treatment    Outcome/Disposition  Patient's Perception of Outcome " Achieved: pt wants help with how he is feeling and is agreeable to plan for inaptietn admission  Assessment, Recommendations and Risk Level Reviewed with: Patient indicated to be high risk level after assessment completed. Reviewed recommendation with ED Physician, Dr Martinez/Roque Medina PA-C who is inagreement with the recommendation for inpatient admission  Contact Name: Roque Medina PA-C  EPAT Assessment Completed Date: 12/31/23  EPAT Assessment Completed Time: 1230  Patient Disposition: Out of network facility (Specify)  Out of Network Reason: Patient requests another facility

## 2023-12-31 NOTE — ED NOTES
Shoes  Jacket  Pants  Shirt  T shrit  Cell phone  Hat  Wallet    PT ITEMS LABELED AND PLACED INSIDE LOCKER #2     Kate Jones, EMT  12/31/23 1145

## 2023-12-31 NOTE — ED PROVIDER NOTES
HPI   Chief Complaint   Patient presents with    Suicidal    PTSD (Post-Traumatic Stress Disorder)       58-year-old male presented emergency department with a chief complaint of suicidality.  He has a plan to overdose.  He has not taken any medications thus far.  States he has a history of PTSD.  He is well-appearing nontoxic afebrile.  Denies lightheadedness dizziness numbness weakness.  Denies alcohol or drug abuse.  He had guns in the home but his son remove them.  He has never had a prior suicide attempt.  No other complaint.                          [place] Coma Scale Score: 15                  Patient History   No past medical history on file.  Past Surgical History:   Procedure Laterality Date    OTHER SURGICAL HISTORY  09/29/2020    Lower back surgery    OTHER SURGICAL HISTORY  09/29/2020    Rhinologic surgery    OTHER SURGICAL HISTORY  09/29/2020    Hand surgery     No family history on file.  Social History     Tobacco Use    Smoking status: Never    Smokeless tobacco: Never   Substance Use Topics    Alcohol use: Yes     Comment: rarely    Drug use: Never       Physical Exam   ED Triage Vitals [12/31/23 1127]   Temp Heart Rate Resp BP   36.5 °C (97.7 °F) 62 20 (!) 219/102      SpO2 Temp Source Heart Rate Source Patient Position   100 % Oral Brachial Sitting      BP Location FiO2 (%)     Left arm --       Physical Exam  Vitals and nursing note reviewed.   Constitutional:       Appearance: Normal appearance.   HENT:      Head: Normocephalic and atraumatic.      Nose: Nose normal.      Mouth/Throat:      Mouth: Mucous membranes are moist.   Cardiovascular:      Rate and Rhythm: Normal rate and regular rhythm.   Pulmonary:      Effort: Pulmonary effort is normal.      Breath sounds: Normal breath sounds.   Abdominal:      General: Abdomen is flat.      Palpations: Abdomen is soft.   Musculoskeletal:         General: Normal range of motion.      Cervical back: Normal range of motion and neck supple.   Skin:      General: Skin is warm and dry.   Neurological:      General: No focal deficit present.      Mental Status: He is alert and oriented to person, place, and time.         ED Course & MDM   ED Course as of 12/31/23 1445   Sun Dec 31, 2023   1411 ECG personally interpreted by me performed at 1308 demonstrates normal sinus rhythm with a ventricular rate 84 normal axis and intervals no acute ischemic changes [EF]      ED Course User Index  [EF] Bhavna Martinez, DO       Medical Decision Making  I have seen and evaluated this patient.  The attending physician has also seen and evaluated this patient.  Vital signs, laboratory testing and diagnostic images if applicable have been reviewed.  All laboratory and imaging is interpreted by myself unless otherwise stated.  Radiology studies are also formally interpreted by radiologist.    CBC without significant leukocytosis or anemia, metabolic panel without significant renal impairment or electrolyte abnormality.    Noted to be hypertensive in the emergency department.  He has a history of hypertension and has not yet taken his daily medications.  His daily medications are ordered.    Patient is medically cleared from an emergent standpoint for treatment at inpatient psychiatric facility.    Labs Reviewed  CBC WITH AUTO DIFFERENTIAL - Abnormal     WBC                           7.1                    nRBC                          0.0                    RBC                           4.43 (*)               Hemoglobin                    13.6                   Hematocrit                    39.9 (*)               MCV                           90                     MCH                           30.7                   MCHC                          34.1                   RDW                           11.8                   Platelets                     235                    Neutrophils %                 45.1                   Immature Granulocytes %, Automated   0.4                     Lymphocytes %                 39.6                   Monocytes %                   9.2                    Eosinophils %                 5.3                    Basophils %                   0.4                    Neutrophils Absolute          3.21                   Immature Granulocytes Absolute, Au*   0.03                   Lymphocytes Absolute          2.83                   Monocytes Absolute            0.66                   Eosinophils Absolute          0.38                   Basophils Absolute            0.03                COMPREHENSIVE METABOLIC PANEL - Abnormal     Glucose                       166 (*)                Sodium                        135                    Potassium                     3.7                    Chloride                      98                     Bicarbonate                   23 (*)                 Urea Nitrogen                 20                     Creatinine                    0.90                   eGFR                          >90                    Calcium                       9.1                    Albumin                       4.4                    Alkaline Phosphatase          71                     Total Protein                 7.3                    AST                           30                     Bilirubin, Total              0.4                    ALT                           39                     Anion Gap                     14                  SARS-COV-2 PCR, SCREEN ASYMPTOMATIC - Normal     Coronavirus 2019, PCR                                    Narrative: This assay has received FDA Emergency Use Authorization (EUA) and is only authorized for the duration of time that circumstances exist to justify the authorization of the emergency use of in vitro diagnostic tests for the detection of SARS-CoV-2 virus and/or diagnosis of COVID-19 infection under section 564(b)(1) of the Act, 21 U.S.C. 360bbb-3(b)(1). This assay is an in vitro diagnostic nucleic acid  amplification test for the qualitative detection of SARS-CoV-2 from nasopharyngeal specimens and has been validated for use at [facility]. Negative results do not preclude COVID-19 infections and should not be used as the sole basis for diagnosis, treatment, or other management decisions.                    ALCOHOL - Normal     Alcohol                       <0.010              URINALYSIS WITH REFLEX CULTURE AND MICROSCOPIC - Normal     Color, Urine                                         Appearance, Urine             Clear                  Specific Gravity, Urine       1.017                  pH, Urine                     5.5                    Protein, Urine                NEGATIVE                Glucose, Urine                Normal                 Blood, Urine                  NEGATIVE                Ketones, Urine                NEGATIVE                Bilirubin, Urine              NEGATIVE                Urobilinogen, Urine           Normal                 Nitrite, Urine                NEGATIVE                Leukocyte Esterase, Urine     NEGATIVE             URINALYSIS WITH REFLEX CULTURE AND MICROSCOPIC         Narrative: The following orders were created for panel order Urinalysis with Reflex Culture and Microscopic.                  Procedure                               Abnormality         Status                                     ---------                               -----------         ------                                     Urinalysis with Reflex C...[[id number]]  Normal              Final result                               Extra Urine Gray Tube[[id number]]                            In process                                                   Please view results for these tests on the individual orders.  DRUG SCREEN,URINE  EXTRA URINE GRAY TUBE    No orders to display          Procedure  Procedures     Roque Medina PA-C  12/31/23 1440

## 2024-01-01 VITALS
SYSTOLIC BLOOD PRESSURE: 114 MMHG | HEART RATE: 70 BPM | BODY MASS INDEX: 39.14 KG/M2 | RESPIRATION RATE: 16 BRPM | OXYGEN SATURATION: 92 % | WEIGHT: 273.37 LBS | TEMPERATURE: 97.7 F | HEIGHT: 70 IN | DIASTOLIC BLOOD PRESSURE: 71 MMHG

## 2024-01-01 LAB
CK SERPL-CCNC: 147 U/L (ref 24–195)
TROPONIN T SERPL-MCNC: 10 NG/L

## 2024-01-01 PROCEDURE — 2500000001 HC RX 250 WO HCPCS SELF ADMINISTERED DRUGS (ALT 637 FOR MEDICARE OP): Performed by: EMERGENCY MEDICINE

## 2024-01-01 RX ORDER — DIPHENHYDRAMINE HCL 50 MG
50 CAPSULE ORAL ONCE
Status: COMPLETED | OUTPATIENT
Start: 2024-01-01 | End: 2024-01-01

## 2024-01-01 RX ORDER — BUPRENORPHINE AND NALOXONE 2; .5 MG/1; MG/1
1 FILM, SOLUBLE BUCCAL; SUBLINGUAL DAILY
Status: DISCONTINUED | OUTPATIENT
Start: 2024-01-01 | End: 2024-01-01 | Stop reason: HOSPADM

## 2024-01-01 RX ORDER — ACETAMINOPHEN 500 MG
10 TABLET ORAL NIGHTLY
Status: DISCONTINUED | OUTPATIENT
Start: 2024-01-01 | End: 2024-01-01 | Stop reason: HOSPADM

## 2024-01-01 RX ORDER — DOXAZOSIN 2 MG/1
2 TABLET ORAL NIGHTLY
Status: DISCONTINUED | OUTPATIENT
Start: 2024-01-01 | End: 2024-01-01 | Stop reason: HOSPADM

## 2024-01-01 RX ORDER — GABAPENTIN 300 MG/1
300 CAPSULE ORAL EVERY 8 HOURS SCHEDULED
Status: DISCONTINUED | OUTPATIENT
Start: 2024-01-01 | End: 2024-01-01 | Stop reason: HOSPADM

## 2024-01-01 RX ORDER — HYDROXYZINE HYDROCHLORIDE 25 MG/1
25 TABLET, FILM COATED ORAL EVERY 4 HOURS PRN
Status: DISCONTINUED | OUTPATIENT
Start: 2024-01-01 | End: 2024-01-01 | Stop reason: HOSPADM

## 2024-01-01 RX ORDER — AMLODIPINE BESYLATE 5 MG/1
5 TABLET ORAL DAILY
Status: DISCONTINUED | OUTPATIENT
Start: 2024-01-01 | End: 2024-01-01 | Stop reason: HOSPADM

## 2024-01-01 RX ORDER — ACETAMINOPHEN 325 MG/1
650 TABLET ORAL ONCE
Status: COMPLETED | OUTPATIENT
Start: 2024-01-01 | End: 2024-01-01

## 2024-01-01 RX ORDER — ACETAMINOPHEN 325 MG/1
650 TABLET ORAL EVERY 4 HOURS PRN
Status: DISCONTINUED | OUTPATIENT
Start: 2024-01-01 | End: 2024-01-01 | Stop reason: HOSPADM

## 2024-01-01 RX ADMIN — DIPHENHYDRAMINE HYDROCHLORIDE 50 MG: 50 CAPSULE ORAL at 05:10

## 2024-01-01 RX ADMIN — Medication 10 MG: at 05:10

## 2024-01-01 RX ADMIN — GABAPENTIN 300 MG: 300 CAPSULE ORAL at 05:10

## 2024-01-01 RX ADMIN — ACETAMINOPHEN 650 MG: 325 TABLET ORAL at 05:10

## 2024-01-01 ASSESSMENT — COLUMBIA-SUICIDE SEVERITY RATING SCALE - C-SSRS
2. HAVE YOU ACTUALLY HAD ANY THOUGHTS OF KILLING YOURSELF?: NO
1. SINCE LAST CONTACT, HAVE YOU WISHED YOU WERE DEAD OR WISHED YOU COULD GO TO SLEEP AND NOT WAKE UP?: NO
6. HAVE YOU EVER DONE ANYTHING, STARTED TO DO ANYTHING, OR PREPARED TO DO ANYTHING TO END YOUR LIFE?: NO

## 2024-01-01 ASSESSMENT — PAIN SCALES - GENERAL: PAINLEVEL_OUTOF10: 0 - NO PAIN

## 2024-01-01 NOTE — PROGRESS NOTES
Social Work Note  12/31/2023 21:14 Pt denied at [facility] due to Dr feeling patient would be more fit for [facility] for continuity of care.  21:16 RY called [facility] and spoke to Myra, who reports that they were unable to verify insurance for patient.  22:09 RY faxed patients clinical packet for bed referral to [facility].  22:22 RY faxed patient's clinical packet for bed referral to [facility].  22:54 RY received phone call from Caterina Phipps requesting patient's SS# and medical questions. RY provided pt's SS# and gave pt's RN [employer] number to answer medical questions if able.  00:26 RY received phone call from Caterina Phipps, reporting that insurance portal is down and will call in morning with clinical decision once the insurance is verified.  01:00 RY requested for registration to check/verify insurance. Registration provides copy of [facility] and states insurance portal is down to verify. Moise states that if insurance can be verified, patient is accepted then will consider if uninsured (as they take uninsured patients).  01:40 RY has not heard back from [facility] at this time.  05:55 RY received phone call from [facility] requesting tox screens, recent vitals and labs to be rechecked and CK due to high BP upon arrival. RY  notified Dr. Ralph who will put lab orders in. RY faxed tox screen and current vitals to [facility].

## 2024-01-01 NOTE — PROGRESS NOTES
"Social Work NoteEPAT - Social Work Psychiatric Assessment    Arrival Details  Mode of Arrival: Ambulatory  Admission Source: Home  Admission Type: Involuntary (pink slip by Health officer in ED)  EPAT Assessment Start Date: 12/31/23  EPAT Assessment Start Time: 1150  Name of : JUANI Marcus    History of Present Illness  Admission Reason: Psychiatric Evaluation for PTSD and depression with SI, plan to overdose on pills.  HPI: Pt is a 59y/o male presenting to [facility] ED with complaint of depression and SI, plan to overdose. Pt is also reporting PTSD symptoms including anxiety, \"feeling trapped\", insomnia and anger outbursts.  reviewed the patient's chart and medical record which indicates a history of PTSD, depression and high blood pressure. Pt is being treated for a back injury as well s/p injury at work in March.  No EPAT assessments to review. The triage risk assessment was reviewed and pt was  indicated to be high risk, pt assigned a sitter.    SW Readmission Information   Readmission within 30 Days: No    Psychiatric Symptoms  Anxiety Symptoms: Panic attack, Excessive sweating, Shortness of breath, Social phobias  Depression Symptoms: Change in energy level, Decreased libido, Feelings of hopelessess, Feelings of worthlessness, Increased irritability, Sleep disturbance  Zoe Symptoms: No problems reported or observed.    Psychosis Symptoms  Hallucination Type: No problems reported or observed.  Delusion Type: No problems reported or observed.    Additional Symptoms - Adult  Generalized Anxiety Disorder: Restlessness, Sleep disturbance  Obsessive Compulsive Disorder: No problems reported or observed.  Panic Attack: Sweaty/clammy, Shortness of breath  Post Traumatic Stress Disorder: Other (Comment), Avoidance of stimuli associated w/ event, Exaggerated startle response, Hypervigilance, Irritability, Outbursts of anger, Re-living event  Delirium: No problems reported or " "observed.  Review of Symptoms Comments: Pt reports has been sleeping only 2 hours a night, feels \"triggered\" by things like being wrapped up in his blankets too tightly, being around alot of people and being in enclosed spaces. Pt and family have noticed an increase in irritability and anger outbursts the past couple of months.    Past Psychiatric History/Meds/Treatments  Past Psychiatric History: Pt was diagnosed with PTSD and depression in 2004 after being injured at work , and is now experiencing similar symptoms after another work injury (mine collapse) in March 2023. No h/o SIB but pt reports he attempted suciide by OD on pills last week.  Past Psychiatric Meds/Treatments: Pt has not been hospitalized psychaitrically. When he was being treated in 2004 he was prescriebed Seroquel which he felt was helpful; with medication and counseling his symptoms improved./ Currently pt is prescribed hydroxizone, gabapentin and BP meds by his PCP; pt is med complaint  Past Violence/Victimization History: Pt denies current history of abuse and denies any past history of abuse including physical, sexual and emotional abuse. 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 Pt has a son with whom he is close, 2 grandchildren    Legal  Legal Considerations: Patient/ Family Ability to Make Healthcare Decisions  Criminal Activity/ Legal Involvement Pertinent to Current Situation/ Hospitalization: None  Legal Concerns: Elaine: Self POA: None Payee: None  Legal Comments: None reported    Drug Screening  Have you used any substances (canabis, cocaine, heroin, hallucinogens, inhalants, etc.) in the past 12 months?: No  Have you used any prescription drugs other than prescribed in the past 12 months?: No  Is a toxicology screen needed?: Yes         Psychosocial  Psychosocial (WDL): Within Defined Limits    Orientation  Orientation Level: Oriented X4, Appropriate for developmental age    General Appearance  Motor Activity: Restlessness  Speech Pattern: Pressured  General Attitude: Cooperative  Appearance/Hygiene: Unremarkable    Thought Process  Coherency: Circumstantial  Content: Unremarkable  Delusions: Other (Comment)  Perception: Not altered  Hallucination: None  Judgment/Insight: Limited  Confusion: None  Cognition: Poor judgement, Appropriate for developmental age         Risk Factors  Self Harm/Suicidal Ideation Plan: Current Plan to OD on pills at home  Previous Self Harm/Suicidal Plans: Past: reports an overdose attempt a coulpe weeks ago  Risk Factors: Male  Description of Thoughts/Ideas Leaving Unit Now: none, pt is cooperative and agreeable to plan for inpatient admission    Violence Risk Assessment  Assessment of Violence: None noted  Thoughts of Harm to Others: No (denies any current HI or plan; reports he has been getting angry and has feelings of rage when he \"feels trapped\")    Ability to Assess Risk Screen  Risk Screen - Ability to Assess: Able to be screened  Ask Suicide-Screening Questions  1. In the past few weeks, have " you wished you were dead?: Yes  2. In the past few weeks, have you felt that you or your family would be better off if you were dead?: Yes  3. In the past week, have you been having thoughts about killing yourself?: Yes  4. Have you ever tried to kill yourself?: Yes  How did you try to kill yourself?: OD on pills  When did you try to kill yourself?: 2 weeks ago  5. Are you having thoughts of killing yourself right now?: Yes  Describe your thoughts of killing yourself right now:: Pt states he feels his family would be better off without him, he is making everyone likfe miserable  Calculated Risk Score: Imminent Risk  Step 1: Risk Factors  Current & Past Psychiatric Dx: PTSD, Mood disorder  Presenting Symptoms: Impulsivity, Hopelessness or despair, Anxiety and/or panic, Insomia  Precipitants/Stressors: Triggering events leading to humiliation, shame, and/or despair (e.g. loss of relationship, financial or health status) (real or anticipated), Perceived burden on others, Social isolation  Change in Treatment: Non-compliant or not receiving treatment  Access to Lethal Methods : No (pt did have guns that he inherited from his grandfather, but all weapons were removed by pt's son this week)  Step 2: Protective Factors   Protective Factors Internal: Identifies reasons for living  Protective Factors External: Responsibility to children  Step 3: Suicidal Ideation Intensity  Most Severe Suicidal Ideation Identified: plan to OD  How Many Times Have You Had These Thoughts: 2-5 times in a week  When You Have the Thoughts How Long do They Last : Less than 1 hour/some of the time  Could/Can You Stop Thinking About Killing Yourself or Wanting to Die if You Want to: Can control thoughts with a lot of difficulty  Are There Things - Anyone or Anything - That Stopped You From Wanting to Die or Acting on: Deterrents most likely did not stop you  What Sort of Reasons Did You Have For Thinking About Wanting to Die or Killing Yourself:  "Mostly to end or stop the pain (you couldn't go on living with the pain or how you were feeling)  Total Score: 17  Step 5: Documentation  Risk Level: High suicide risk    Psychiatric Impression and Plan of Care  Assessment and Plan: Upon assessment, pt presents as cooperative but anxious, speech is pressured and somewhat circumsantial but affect is appropriate. Pt is A&O x4, agreeable to plan of care. Pt reports he was in a work accident in March where he was trapped underneath a hydraulic lift and sustained some back and leg injuries. At the time pt felt like he couldn't breathe and was concerned that he would die. Since then pt has been experiencing worsening  PTSD symptoms including very poor sleep (only about 2 hrs a night), anxiety and panic attacks when \"feeling trapped\" (incidents have included being accidentally locked in his apartments basement, being in the grocery store when it was very busy) and anger outbursts. Pt has been having suicidal thoughts as a result, did attempt to OD 2 weeks ago. Pt states he feels \"like a burden\" to his family and he is \"making their lives miserable\" Pt states if he isnt thinking about the incident he feels ok, but sleep has been difficult and he feels like he is easily triggered by situations and flashbacks to the incident. He is angry with his employer for how the injury happened, and the anger will spill over into other situations (road rage incident, arguments with his neighbors) Pt feels that without immediate treatmetn \"either I'll kill myself or go postal\"  The [place] -Suicide Severity Rating Scale (C-SSRS) was reviewed after the assessment was completed and the patient was indicated to be High risk. He would benefit from inpatient admission for safety and further treatment.  Specific Resources Provided to Patient: Peer support services not needed at this time.  Plan Comments: Diagnostic Impression: PTSD, major Depressive D/O  Plan: Inpatient " treatment    Outcome/Disposition  Patient's Perception of Outcome Achieved: pt wants help with how he is feeling and is agreeable to plan for inaptietn admission  Assessment, Recommendations and Risk Level Reviewed with: Patient indicated to be high risk level after assessment completed. Reviewed recommendation with ED Physician, Dr Martinez/Roque Medina PA-C who is inagreement with the recommendation for inpatient admission  Contact Name: Roque Medina PA-C  EPAT Assessment Completed Date: 12/31/23  EPAT Assessment Completed Time: 1230  Patient Disposition: Out of network facility (Specify)  Out of Network Reason: Patient requests another facility

## 2024-01-01 NOTE — PROGRESS NOTES
"Jeramie Faria is a 58 y.o. male on day 0 of admission presenting with No Principal Problem: There is no principal problem currently on the Problem List. Please update the Problem List and refresh..    Subjective   58-year-old male presented emergency department with a chief complaint of suicidality.  He has a plan to overdose.  He has not taken any medications thus far.  States he has a history of PTSD.  He is well-appearing nontoxic afebrile.  Denies lightheadedness dizziness numbness weakness.  Denies alcohol or drug abuse.  He had guns in the home but his son remove them.  He has never had a prior suicide attempt.  No other complaint.        Objective     Physical Exam  Vitals and nursing note reviewed.   Constitutional:       Appearance: Normal appearance. He is not ill-appearing.   HENT:      Head: Normocephalic and atraumatic.   Cardiovascular:      Rate and Rhythm: Normal rate.   Pulmonary:      Effort: Pulmonary effort is normal. No respiratory distress.   Musculoskeletal:         General: Normal range of motion.   Skin:     General: Skin is dry.   Neurological:      General: No focal deficit present.      Mental Status: He is alert.   Psychiatric:         Attention and Perception: Attention normal.         Mood and Affect: Mood is depressed.         Behavior: Behavior is cooperative.         Thought Content: Thought content includes suicidal ideation.         Judgment: Judgment normal.         Last Recorded Vitals  Blood pressure 140/84, pulse 78, temperature 36.5 °C (97.7 °F), temperature source Oral, resp. rate 16, height 1.778 m (5' 10\"), weight 124 kg (273 lb 5.9 oz), SpO2 97 %.     58-year-old male presenting with suicidal ideation.  He was initially evaluated by the previous emergency team's and medically cleared for psychiatric disposition.  The patient was presented to several psychiatric institutions, however there is concern that he had an elevated blood pressure on arrival to the emergency " department.  He had not complained of chest pain.  To be thorough on the medical clearance for the patient currently a troponin and CPK level were added to his previous lab work to unequivocally rule out cardiac pathology from hypertension.  This is extremely unlikely given that the patient's blood pressure improved without intervention and he never developed chest pain.  By the ED standpoint he continues to be medically cleared.  He is still boarding in the emergency department with a pink slip.  His home medications are being ordered as recurrent medications as he may require a longer boarding.  Due to insurance complications.  No events overnight.  Care is currently being transitioned to the day team.       Erin Cardona MD

## 2024-01-01 NOTE — ED NOTES
Received bedside report from night shift tech. Pt asleep at this time.     Preethi Salgado, EMT  01/01/24 0710

## 2024-01-08 DIAGNOSIS — F41.9 ANXIETY: ICD-10-CM

## 2024-01-11 ENCOUNTER — TELEMEDICINE (OUTPATIENT)
Dept: PRIMARY CARE | Facility: CLINIC | Age: 59
End: 2024-01-11
Payer: COMMERCIAL

## 2024-01-11 NOTE — PROGRESS NOTES
Subjective   Patient ID: Jeramie Faria is a 58 y.o. male who presents for Follow-up (States H/A , fatigue).    HPI     Review of Systems    Objective   There were no vitals taken for this visit.    Physical Exam    Assessment/Plan   {Assess/PlanSmartLinks:92046}

## 2024-01-12 RX ORDER — CITALOPRAM 20 MG/1
20 TABLET, FILM COATED ORAL DAILY
Qty: 30 TABLET | Refills: 0 | Status: SHIPPED | OUTPATIENT
Start: 2024-01-12 | End: 2024-01-31 | Stop reason: SDUPTHER

## 2024-01-31 ENCOUNTER — OFFICE VISIT (OUTPATIENT)
Dept: PRIMARY CARE | Facility: CLINIC | Age: 59
End: 2024-01-31
Payer: COMMERCIAL

## 2024-01-31 VITALS
SYSTOLIC BLOOD PRESSURE: 127 MMHG | DIASTOLIC BLOOD PRESSURE: 90 MMHG | BODY MASS INDEX: 39.22 KG/M2 | HEIGHT: 70 IN | WEIGHT: 274 LBS

## 2024-01-31 DIAGNOSIS — F41.0 PANIC ATTACK: ICD-10-CM

## 2024-01-31 DIAGNOSIS — F41.9 ANXIETY: ICD-10-CM

## 2024-01-31 DIAGNOSIS — F51.01 PRIMARY INSOMNIA: Primary | ICD-10-CM

## 2024-01-31 DIAGNOSIS — I10 PRIMARY HYPERTENSION: ICD-10-CM

## 2024-01-31 DIAGNOSIS — I10 HYPERTENSION, UNSPECIFIED TYPE: ICD-10-CM

## 2024-01-31 PROCEDURE — 1036F TOBACCO NON-USER: CPT | Performed by: STUDENT IN AN ORGANIZED HEALTH CARE EDUCATION/TRAINING PROGRAM

## 2024-01-31 PROCEDURE — 3080F DIAST BP >= 90 MM HG: CPT | Performed by: STUDENT IN AN ORGANIZED HEALTH CARE EDUCATION/TRAINING PROGRAM

## 2024-01-31 PROCEDURE — 3074F SYST BP LT 130 MM HG: CPT | Performed by: STUDENT IN AN ORGANIZED HEALTH CARE EDUCATION/TRAINING PROGRAM

## 2024-01-31 PROCEDURE — 99214 OFFICE O/P EST MOD 30 MIN: CPT | Performed by: STUDENT IN AN ORGANIZED HEALTH CARE EDUCATION/TRAINING PROGRAM

## 2024-01-31 RX ORDER — TRAZODONE HYDROCHLORIDE 50 MG/1
50 TABLET ORAL NIGHTLY PRN
Qty: 90 TABLET | Refills: 3 | Status: SHIPPED | OUTPATIENT
Start: 2024-01-31 | End: 2025-01-30

## 2024-01-31 RX ORDER — AMLODIPINE BESYLATE 5 MG/1
5 TABLET ORAL DAILY
Qty: 90 TABLET | Refills: 3 | Status: SHIPPED | OUTPATIENT
Start: 2024-01-31 | End: 2025-01-30

## 2024-01-31 RX ORDER — LOSARTAN POTASSIUM AND HYDROCHLOROTHIAZIDE 25; 100 MG/1; MG/1
1 TABLET ORAL
Qty: 90 TABLET | Refills: 3 | Status: SHIPPED | OUTPATIENT
Start: 2024-01-31 | End: 2025-01-30

## 2024-01-31 RX ORDER — DULOXETINE 40 MG/1
40 CAPSULE, DELAYED RELEASE ORAL DAILY
COMMUNITY
End: 2024-01-31 | Stop reason: WASHOUT

## 2024-01-31 RX ORDER — LOSARTAN POTASSIUM 25 MG/1
100 TABLET ORAL DAILY
COMMUNITY
Start: 2021-05-28 | End: 2024-01-31 | Stop reason: WASHOUT

## 2024-01-31 RX ORDER — DOXAZOSIN 2 MG/1
2 TABLET ORAL NIGHTLY
Qty: 90 TABLET | Refills: 3 | Status: SHIPPED | OUTPATIENT
Start: 2024-01-31 | End: 2025-01-30

## 2024-01-31 RX ORDER — HYDROXYZINE HYDROCHLORIDE 25 MG/1
25 TABLET, FILM COATED ORAL 2 TIMES DAILY PRN
Qty: 180 TABLET | Refills: 1 | Status: SHIPPED | OUTPATIENT
Start: 2024-01-31 | End: 2024-07-29

## 2024-01-31 RX ORDER — CITALOPRAM 40 MG/1
40 TABLET, FILM COATED ORAL DAILY
Qty: 90 TABLET | Refills: 1 | Status: SHIPPED | OUTPATIENT
Start: 2024-01-31 | End: 2024-07-29

## 2024-01-31 NOTE — PROGRESS NOTES
Subjective   Patient ID: Jeramie Faria is a 58 y.o. male who presents for Med Refill (Hydroxyzine; Doxazosin; Duloxetine), Medication Problem (Celexa not working ), and Hypertension (Just to discuss).  HPI  Jeramie reports for follow up visit today.    He was having suicidal ideation and presented for evaluation and was admitted to [facility] psych from 12/31-1/5/24. He has been feeling well since getting discharged. No SI/HI since being discharged. He has been taking all of his medications as prescribed, He has been checking his BP at home and it has been controlled on home readings. He continues have have trouble sleeping at night.    Review of Systems  12-point ROS was reviewed and is negative, unless otherwise noted in HPI    Objective   Vitals:    01/31/24 1347   BP: 127/90      Physical Exam  GEN: alert, conversant, anxious appearing  HEENT: PERRL, EOMI, MMM  NECK: supple, no LAD appreciated  CHEST: CTAB  CV: S1, S2, RRR, no murmurs appreciated  ABD: soft, NT, ND  EXT: no significant LE edema  SKIN: warm, dry    Assessment/Plan   #Hypertension  Controlled  Adverse side effect of feet swelling with 10mg amlodipine  - Continue amlodipine 5mg, losartan-hydrochlorothiazide 100-25mg daily, doxazosin 2mg nightly  Discussed DASH diet and dietary sodium restrictions.   Increase dietary efforts and physical activity.     #Depression/Anxiety  #Panic Attacks  #insomnia  - He has been established with psychiatry via worker's comp and is going to therapy twice weekly  - Was inpatient psychiatry for 5 days from 12/31/23-1/5/24.    - Continue Celexa 40mg daily  - Continue atarax 25mg, PRN breakthrough anxiety  - Add Trazodone 50mg nightly    RTC in 2 months, sooner PRN     Dm Montes,

## 2024-01-31 NOTE — PROGRESS NOTES
"Subjective   Patient ID: Jeramie Faria is a 58 y.o. male who presents for Med Refill (Hydroxyzine; Doxazosin; Duloxetine), Medication Problem (Celexa not working ), and Hypertension (Just to discuss).    HPI     Review of Systems    Objective   /90   Ht 1.778 m (5' 10\")   Wt 124 kg (274 lb)   BMI 39.31 kg/m²     Physical Exam    Assessment/Plan          "

## 2024-01-31 NOTE — PROGRESS NOTES
"Subjective   Patient ID: Jeramie Faria is a 58 y.o. male who presents for Med Refill (Hydroxzine; Doxazosin) and Medication Problem (Celexa not working ).    HPI     Review of Systems    Objective   /90   Ht 1.778 m (5' 10\")   Wt 124 kg (274 lb)   BMI 39.31 kg/m²     Physical Exam    Assessment/Plan   {Assess/PlanSmartLinks:23664}       "

## 2024-04-01 DIAGNOSIS — F41.9 ANXIETY: Primary | ICD-10-CM

## 2024-04-02 RX ORDER — DULOXETINE 40 MG/1
40 CAPSULE, DELAYED RELEASE ORAL DAILY
Qty: 30 CAPSULE | Refills: 0 | Status: SHIPPED | OUTPATIENT
Start: 2024-04-02 | End: 2024-04-25

## 2024-04-25 DIAGNOSIS — F41.9 ANXIETY: ICD-10-CM

## 2024-04-25 RX ORDER — DULOXETINE 40 MG/1
40 CAPSULE, DELAYED RELEASE ORAL DAILY
Qty: 30 CAPSULE | Refills: 0 | Status: SHIPPED | OUTPATIENT
Start: 2024-04-25

## 2024-07-28 DIAGNOSIS — F41.0 PANIC ATTACK: ICD-10-CM

## 2024-07-28 DIAGNOSIS — F41.9 ANXIETY: ICD-10-CM

## 2024-07-30 RX ORDER — HYDROXYZINE HYDROCHLORIDE 25 MG/1
25 TABLET, FILM COATED ORAL 2 TIMES DAILY PRN
Qty: 60 TABLET | Refills: 0 | Status: SHIPPED | OUTPATIENT
Start: 2024-07-30 | End: 2025-01-26

## 2025-01-02 ENCOUNTER — OFFICE VISIT (OUTPATIENT)
Dept: PRIMARY CARE | Facility: CLINIC | Age: 60
End: 2025-01-02

## 2025-01-02 VITALS — DIASTOLIC BLOOD PRESSURE: 90 MMHG | SYSTOLIC BLOOD PRESSURE: 146 MMHG | WEIGHT: 268 LBS | BODY MASS INDEX: 38.45 KG/M2

## 2025-01-02 DIAGNOSIS — M43.10 ACQUIRED SPONDYLOLISTHESIS: ICD-10-CM

## 2025-01-02 DIAGNOSIS — Z12.5 PROSTATE CANCER SCREENING: ICD-10-CM

## 2025-01-02 DIAGNOSIS — Z12.11 COLON CANCER SCREENING: ICD-10-CM

## 2025-01-02 DIAGNOSIS — I10 HYPERTENSION, UNSPECIFIED TYPE: ICD-10-CM

## 2025-01-02 DIAGNOSIS — F41.9 ANXIETY: ICD-10-CM

## 2025-01-02 DIAGNOSIS — Z00.00 WELL ADULT EXAM: Primary | ICD-10-CM

## 2025-01-02 DIAGNOSIS — I10 PRIMARY HYPERTENSION: ICD-10-CM

## 2025-01-02 PROCEDURE — 99396 PREV VISIT EST AGE 40-64: CPT | Performed by: STUDENT IN AN ORGANIZED HEALTH CARE EDUCATION/TRAINING PROGRAM

## 2025-01-02 PROCEDURE — 1036F TOBACCO NON-USER: CPT | Performed by: STUDENT IN AN ORGANIZED HEALTH CARE EDUCATION/TRAINING PROGRAM

## 2025-01-02 PROCEDURE — 3080F DIAST BP >= 90 MM HG: CPT | Performed by: STUDENT IN AN ORGANIZED HEALTH CARE EDUCATION/TRAINING PROGRAM

## 2025-01-02 PROCEDURE — 3077F SYST BP >= 140 MM HG: CPT | Performed by: STUDENT IN AN ORGANIZED HEALTH CARE EDUCATION/TRAINING PROGRAM

## 2025-01-02 RX ORDER — PRAZOSIN HYDROCHLORIDE 1 MG/1
1 CAPSULE ORAL
COMMUNITY
Start: 2024-07-16

## 2025-01-02 RX ORDER — OXCARBAZEPINE 300 MG/1
TABLET, FILM COATED ORAL
COMMUNITY
Start: 2024-07-16

## 2025-01-02 RX ORDER — DOXAZOSIN 2 MG/1
2 TABLET ORAL NIGHTLY
Qty: 90 TABLET | Refills: 3 | Status: SHIPPED | OUTPATIENT
Start: 2025-01-02 | End: 2026-01-02

## 2025-01-02 RX ORDER — LOSARTAN POTASSIUM AND HYDROCHLOROTHIAZIDE 25; 100 MG/1; MG/1
1 TABLET ORAL
Qty: 90 TABLET | Refills: 3 | Status: SHIPPED | OUTPATIENT
Start: 2025-01-02 | End: 2026-01-02

## 2025-01-02 RX ORDER — AMLODIPINE BESYLATE 5 MG/1
5 TABLET ORAL DAILY
Qty: 90 TABLET | Refills: 3 | Status: SHIPPED | OUTPATIENT
Start: 2025-01-02 | End: 2026-01-02

## 2025-01-02 ASSESSMENT — PATIENT HEALTH QUESTIONNAIRE - PHQ9
SUM OF ALL RESPONSES TO PHQ9 QUESTIONS 1 AND 2: 4
1. LITTLE INTEREST OR PLEASURE IN DOING THINGS: MORE THAN HALF THE DAYS
2. FEELING DOWN, DEPRESSED OR HOPELESS: MORE THAN HALF THE DAYS

## 2025-01-02 NOTE — PROGRESS NOTES
Subjective   Patient ID: Jeramie Faria is a 59 y.o. male who presents for Med Refill and Annual Exam (physical).  HPI  Jeramie reports for follow up/yearly physical.     He denies any active complaints. He says he regularly checks his blood pressure at home which has been ranging between 90s to 120s systolic. He endorses weight gain and has been eating more than usual. He does eat a balanced diet, and has been mildly active. He endorses insomnia and stressors, and has been following with psychiatry.    Review of Systems  12-point ROS was reviewed and is negative, unless otherwise noted in HPI    Objective   Vitals:    01/02/25 1325   BP: 146/90      Physical Exam  GEN: alert, conversant  HEENT: PERRL, EOMI, MMM  NECK: supple, no LAD appreciated  CHEST: CTAB  CV: S1, S2, RRR, no murmurs appreciated  ABD: soft, NT, ND  EXT: no significant LE edema  SKIN: warm, dry    Assessment/Plan   #well adult  - Counseled continued efforts on healthy lifestyle modification including balanced diet, and continued exercise for >5 minutes  - counseled age appropriate vaccines and preventative measures    #Hypertension  Controlled  Adverse side effect of feet swelling with 10mg amlodipine  - Continue amlodipine 5mg, losartan-hydrochlorothiazide 100-25mg daily, doxazosin 2mg nightly  Discussed DASH diet and dietary sodium restrictions.   Increase dietary efforts and physical activity.     #Obesity, Class 2  - Counseled continued efforts on lifestyle modification including weight loss, diet, and increased exercise for >5 minutes  - Offered referral to nutritionist/dietician    #Peripheral neuropathy  - Gabapentin 300 mg TID, following with [employer] comp physician    #Depression/Anxiety  #Panic Attacks  #insomnia  - He has been established with psychiatry via worker's comp and is going to therapy twice weekly  - Was inpatient psychiatry for 5 days from 12/31/23-1/5/24.    - Managed by psychiatry, on atarax 50 mg, duloxetine 60 mg, oxcarbazepine 300  mg and prazosin 2 mg    Health Maintenance:  Vaccines: COVID (x2), Flu (advised), Tdap (advised), shingles (advised)  Screening: Cologuard ordered today  Labs: CBC, CMP, lipid, HbA1c, Vit D, PSA    RTC in 12 months, sooner PRN     Mireya Huggins MD    Trainee role: Resident    I saw and evaluated the patient. I personally obtained the key and critical portions of the history and physical exam or was physically present for key and critical portions performed by the trainee. I reviewed the trainee's documentation and discussed the patient with the trainee. I agree with the trainee's medical decision making as documented on the trainee's notes.    Dm Montes, DO

## 2025-06-24 LAB
ALBUMIN SERPL-MCNC: 4.6 G/DL (ref 3.6–5.1)
ALP SERPL-CCNC: 58 U/L (ref 35–144)
ALT SERPL-CCNC: 14 U/L (ref 9–46)
ANION GAP SERPL CALCULATED.4IONS-SCNC: 11 MMOL/L (CALC) (ref 7–17)
AST SERPL-CCNC: 15 U/L (ref 10–35)
BILIRUB SERPL-MCNC: 0.5 MG/DL (ref 0.2–1.2)
BUN SERPL-MCNC: 14 MG/DL (ref 7–25)
CALCIUM SERPL-MCNC: 9.3 MG/DL (ref 8.6–10.3)
CHLORIDE SERPL-SCNC: 101 MMOL/L (ref 98–110)
CO2 SERPL-SCNC: 26 MMOL/L (ref 20–32)
CREAT SERPL-MCNC: 0.73 MG/DL (ref 0.7–1.35)
EGFRCR SERPLBLD CKD-EPI 2021: 104 ML/MIN/1.73M2
ERYTHROCYTE [DISTWIDTH] IN BLOOD BY AUTOMATED COUNT: 13.1 % (ref 11–15)
EST. AVERAGE GLUCOSE BLD GHB EST-MCNC: 126 MG/DL
EST. AVERAGE GLUCOSE BLD GHB EST-SCNC: 7 MMOL/L
GLUCOSE SERPL-MCNC: 105 MG/DL (ref 65–99)
HBA1C MFR BLD: 6 %
HCT VFR BLD AUTO: 42 % (ref 38.5–50)
HGB BLD-MCNC: 13.8 G/DL (ref 13.2–17.1)
MCH RBC QN AUTO: 30.8 PG (ref 27–33)
MCHC RBC AUTO-ENTMCNC: 32.9 G/DL (ref 32–36)
MCV RBC AUTO: 93.8 FL (ref 80–100)
PLATELET # BLD AUTO: 257 THOUSAND/UL (ref 140–400)
PMV BLD REES-ECKER: 10.4 FL (ref 7.5–12.5)
POTASSIUM SERPL-SCNC: 4.1 MMOL/L (ref 3.5–5.3)
PROT SERPL-MCNC: 7.3 G/DL (ref 6.1–8.1)
PSA SERPL-MCNC: 0.11 NG/ML
RBC # BLD AUTO: 4.48 MILLION/UL (ref 4.2–5.8)
SODIUM SERPL-SCNC: 138 MMOL/L (ref 135–146)
WBC # BLD AUTO: 7.1 THOUSAND/UL (ref 3.8–10.8)